# Patient Record
Sex: FEMALE | Race: WHITE
[De-identification: names, ages, dates, MRNs, and addresses within clinical notes are randomized per-mention and may not be internally consistent; named-entity substitution may affect disease eponyms.]

---

## 2022-02-21 ENCOUNTER — HOSPITAL ENCOUNTER (EMERGENCY)
Dept: HOSPITAL 75 - ER | Age: 32
Discharge: HOME | End: 2022-02-21
Payer: COMMERCIAL

## 2022-02-21 VITALS — DIASTOLIC BLOOD PRESSURE: 90 MMHG | SYSTOLIC BLOOD PRESSURE: 127 MMHG

## 2022-02-21 DIAGNOSIS — F32.9: ICD-10-CM

## 2022-02-21 DIAGNOSIS — Z79.899: ICD-10-CM

## 2022-02-21 DIAGNOSIS — K08.89: Primary | ICD-10-CM

## 2022-02-21 DIAGNOSIS — F41.9: ICD-10-CM

## 2022-02-21 PROCEDURE — 99283 EMERGENCY DEPT VISIT LOW MDM: CPT

## 2022-02-21 NOTE — ED EENT
History of Present Illness


General


Chief Complaint:  Dental Problems/Pain


Stated Complaint:  DENTAL PAIN


Nursing Triage Note:  


Pt arrives via POV from home for c/o lower right dental pain; onset Saturday AM.




Pt also reports that today is the last day of her quarantine after positive 


COVID test. 


 (ANUPAMA ROQUE DO)





Allergies and Home Medications


Allergies


Coded Allergies:  


     No Known Drug Allergies (Unverified , 10/17/10)





Patient Home Medication List


Amoxicillin/Potassium Clav (Augmentin 875-125 Tablet) 1 Each Tablet, 1 EACH PO 

BID


   Prescribed by: ANUPAMA ROQUE on 2/21/22 0159


Clindamycin HCl (Clindamycin HCl) 75 Mg Capsule, Unknown Dose PO, (Reported)


   Entered as Reported by: MARGIE PIKE on 10/12/19 2351


Dextroamphetamine/Amphetamine (Adderall 10 mg Tablet) 10 Mg Tablet, Unknown Dose

PO, (Reported)


   Entered as Reported by: MARGIE PIKE on 10/12/19 2351


Dextroamphetamine/Amphetamine (Adderall Xr 10 mg Capsule) 10 Mg Cap.er.24h, 

Unknown Dose PO, (Reported)


   Entered as Reported by: MARGIE PIKE on 10/12/19 2351


Fexofenadine HCl (Allegra Allergy) 60 Mg Tablet, Unknown Dose PO, (Reported)


   Entered as Reported by: MARGIE PIKE on 10/12/19 2351


Hydroxyzine HCl (Hydroxyzine HCl) 25 Mg Tablet, 25-50 MG PO Q6H


   Prescribed by: ANUPAMA ROQUE on 10/13/19 0003


Lidocaine HCl (Lidocaine HCl Viscous) 15 Ml Solution, 1-2 ML MM [id number]


   Prescribed by: ANUPAMA ROQUE on 2/21/22 0159


Naproxen (Naproxen) 500 Mg Tablet.dr, 500 MG PO BID


   Prescribed by: ANUPAMA ROQUE on 2/21/22 0159


Prednisone (Prednisone) 20 Mg Tab, 40 MG PO DAILY


   Prescribed by: ANUPAMA ROQUE on 10/13/19 0003


Ranitidine HCl (Acid Reducer (RANITIDINE)) 75 Mg Tablet, Unknown Dose PO BID, 

(Reported)


   Entered as Reported by: MARGIE PIKE on 10/12/19 2351


Sertraline HCl (Zoloft) 25 Mg Tablet, Unknown Dose PO, (Reported)


   Entered as Reported by: MARGIE PIKE on 10/12/19 2351





Past Medical-Social-Family Hx


Patient Social History


Tobacco Use?:  No


Use of E-Cig and/or Vaping dev:  No


Substance use?:  No


Alcohol Use?:  No


Pt feels they are or have been:  No


 (ANUPAMA ROQUE DO)





Immunizations Up To Date


Tetanus Booster (TDap):  Unknown


PED Vaccines UTD:  Yes


Influenza Vaccine Up-to-Date:  Yes; Up-to-Date


COVID19 Vaccine :  Pfizer


 (ANUPAMA ROQUE DO)





Seasonal Allergies


Seasonal Allergies:  Yes


 (ANUPAMA ROQUE DO)





Past Medical History


Surgeries:  No


Respiratory:  No


Cardiac:  No


Neurological:  No


Reproductive Disorders:  Yes


Female Reproductive Disorders:  Menstrual Problems


Genitourinary:  No


Gastrointestinal:  No


Musculoskeletal:  No


Endocrine:  No


HEENT:  Yes (DENTAL INFECTION)


Cancer:  No


Psychosocial:  Yes


ADD/ADHD, Anxiety, Depression


Integumentary:  No


Blood Disorders:  No


 (ANUPAMA ROQUE DO)





Family Medical History


No Pertinent Family Hx


 (ANUPAMA ROQUE DO)





Physical Exam


Vital Signs





Vital Signs - First Documented








 2/21/22





 01:00


 


Temp 35.8


 


Pulse 83


 


Resp 18


 


B/P (MAP) 127/90 (102)


 


Pulse Ox 98


 


O2 Delivery Room Air





 (MARBIN CHAMPION MD)


Height, Weight, BMI


Height: 5'2.00"


Weight: 180lbs. oz. 81.223057uo; 44.00 BMI


Method:Stated


 


 (ANUPAMA ROQUE DO)





Progress/Results/Core Measures


Results/Orders


Medications Given in ED





Current Medications








 Medications  Dose


 Ordered  Sig/Maria Teresa


 Route  Start Time


 Stop Time Status Last Admin


Dose Admin


 


 Lidocaine HCl  5 ml  ONCE  ONCE


 MM  2/21/22 02:00


 2/21/22 02:01 DC 2/21/22 02:11


5 ML





 (MARBIN CHAMPION MD)


Vital Signs/I&O











 2/21/22





 01:00


 


Temp 35.8


 


Pulse 83


 


Resp 18


 


B/P (MAP) 127/90 (102)


 


Pulse Ox 98


 


O2 Delivery Room Air





 (MARBIN CHAMPION MD)








Blood Pressure Mean:                    102











Departure


Impression





   Primary Impression:  


   Pain, dental


Disposition:  01 HOME, SELF-CARE


Condition:  Stable





Departure-Patient Inst.


Decision time for Depature:  01:55


 (ANUPAMA ROQUE DO)


Referrals:  


NO,LOCAL PHYSICIAN (PCP/Family)


Primary Care Physician


Patient Instructions:  Dental Pain





Add. Discharge Instructions:  


follow up with your dentist this week for further care





All discharge instructions reviewed with patient and/or family. Voiced 

understanding.


Scripts


Lidocaine HCl (Lidocaine HCl Viscous) 15 Ml Solution


1-2 ML MM [id number], #120 ML


   Prov: ANUPAMA ROQUE DO         2/21/22 


Naproxen (Naproxen) 500 Mg Tablet.dr


500 MG PO BID, #20 TAB


   Prov: ANUPAMA ROQUE DO         2/21/22 


Amoxicillin/Potassium Clav (Augmentin 875-125 Tablet) 1 Each Tablet


1 EACH PO BID for 10 Days, #20 TAB


   Prov: ANUPAMA ROQUE DO         2/21/22











ANUPAMA ROQUE DO                 Feb 21, 2022 01:59


MARBIN CHAMPION MD         Feb 21, 2022 09:31

## 2022-07-06 ENCOUNTER — HOSPITAL ENCOUNTER (OUTPATIENT)
Dept: HOSPITAL 75 - RAD | Age: 32
End: 2022-07-06
Attending: NURSE PRACTITIONER
Payer: COMMERCIAL

## 2022-07-06 DIAGNOSIS — N83.202: Primary | ICD-10-CM

## 2022-07-06 PROCEDURE — 76856 US EXAM PELVIC COMPLETE: CPT

## 2022-07-06 PROCEDURE — 76830 TRANSVAGINAL US NON-OB: CPT

## 2022-07-06 NOTE — DIAGNOSTIC IMAGING REPORT
PROCEDURE: Pelvic comp/transvaginal sonogram.



TECHNIQUE: Complete transabdominal and transvaginal pelvic

ultrasound was performed. In addition, limited pelvic Doppler was

performed.



INDICATION:  Irregular menses.



Uterus is anteverted measuring 5.7 x 3.4 x 4.2 cm. Endometrium is

10 mm in thickness. No myometrial mass is detected. Right ovary

measures 3.4 x 2.1 x 2.3 cm and the left ovary measures 2.6 x 2.0

x 2.9 cm. Right ovary contains several small follicles. Left

ovary contains a 2.3 x 1.3 x 2.3 cm cyst. Both ovaries

demonstrate blood flow. There is no free fluid detected.



IMPRESSION: 2.3 cm left ovarian cyst. The study is otherwise

unremarkable.







Dictated by: 



  Dictated on workstation # [id number]

## 2023-07-17 ENCOUNTER — HOSPITAL ENCOUNTER (EMERGENCY)
Dept: HOSPITAL 75 - ER | Age: 33
Discharge: HOME | End: 2023-07-17
Payer: COMMERCIAL

## 2023-07-17 VITALS — HEIGHT: 61.81 IN | WEIGHT: 249.78 LBS | BODY MASS INDEX: 45.97 KG/M2

## 2023-07-17 VITALS — DIASTOLIC BLOOD PRESSURE: 63 MMHG | SYSTOLIC BLOOD PRESSURE: 120 MMHG

## 2023-07-17 DIAGNOSIS — K80.51: Primary | ICD-10-CM

## 2023-07-17 LAB
ALBUMIN SERPL-MCNC: 4.2 GM/DL (ref 3.2–4.5)
ALP SERPL-CCNC: 60 U/L (ref 40–136)
ALT SERPL-CCNC: 46 U/L (ref 0–55)
APTT PPP: YELLOW S
BACTERIA #/AREA URNS HPF: (no result) /HPF
BASOPHILS # BLD AUTO: 0.1 10^3/UL (ref 0–0.1)
BASOPHILS NFR BLD AUTO: 0 % (ref 0–10)
BILIRUB SERPL-MCNC: 0.4 MG/DL (ref 0.1–1)
BILIRUB UR QL STRIP: NEGATIVE
BUN/CREAT SERPL: 18
CALCIUM SERPL-MCNC: 9.6 MG/DL (ref 8.5–10.1)
CHLORIDE SERPL-SCNC: 107 MMOL/L (ref 98–107)
CO2 SERPL-SCNC: 24 MMOL/L (ref 21–32)
CREAT SERPL-MCNC: 0.79 MG/DL (ref 0.6–1.3)
EOSINOPHIL # BLD AUTO: 0.1 10^3/UL (ref 0–0.3)
EOSINOPHIL NFR BLD AUTO: 1 % (ref 0–10)
FIBRINOGEN PPP-MCNC: CLEAR MG/DL
GFR SERPLBLD BASED ON 1.73 SQ M-ARVRAT: 103 ML/MIN
GLUCOSE SERPL-MCNC: 94 MG/DL (ref 70–105)
GLUCOSE UR STRIP-MCNC: NEGATIVE MG/DL
HCT VFR BLD CALC: 39 % (ref 35–52)
HGB BLD-MCNC: 12.5 G/DL (ref 11.5–16)
KETONES UR QL STRIP: NEGATIVE
LEUKOCYTE ESTERASE UR QL STRIP: (no result)
LIPASE SERPL-CCNC: 67 U/L (ref 8–78)
LYMPHOCYTES # BLD AUTO: 2.7 10^3/UL (ref 1–4)
LYMPHOCYTES NFR BLD AUTO: 22 % (ref 12–44)
MANUAL DIFFERENTIAL PERFORMED BLD QL: NO
MCH RBC QN AUTO: 27 PG (ref 25–34)
MCHC RBC AUTO-ENTMCNC: 32 G/DL (ref 32–36)
MCV RBC AUTO: 84 FL (ref 80–99)
MONOCYTES # BLD AUTO: 0.8 10^3/UL (ref 0–1)
MONOCYTES NFR BLD AUTO: 6 % (ref 0–12)
NEUTROPHILS # BLD AUTO: 8.7 10^3/UL (ref 1.8–7.8)
NEUTROPHILS NFR BLD AUTO: 70 % (ref 42–75)
NITRITE UR QL STRIP: NEGATIVE
PH UR STRIP: 7.5 [PH] (ref 5–9)
PLATELET # BLD: 357 10^3/UL (ref 130–400)
PMV BLD AUTO: 10.3 FL (ref 9–12.2)
POTASSIUM SERPL-SCNC: 4.2 MMOL/L (ref 3.6–5)
PROT SERPL-MCNC: 7.6 GM/DL (ref 6.4–8.2)
PROT UR QL STRIP: NEGATIVE
RBC #/AREA URNS HPF: (no result) /HPF
SODIUM SERPL-SCNC: 139 MMOL/L (ref 135–145)
SP GR UR STRIP: 1.01 (ref 1.02–1.02)
SQUAMOUS #/AREA URNS HPF: (no result) /HPF
WBC # BLD AUTO: 12.4 10^3/UL (ref 4.3–11)
WBC #/AREA URNS HPF: (no result) /HPF

## 2023-07-17 PROCEDURE — 36415 COLL VENOUS BLD VENIPUNCTURE: CPT

## 2023-07-17 PROCEDURE — 80053 COMPREHEN METABOLIC PANEL: CPT

## 2023-07-17 PROCEDURE — 74177 CT ABD & PELVIS W/CONTRAST: CPT

## 2023-07-17 PROCEDURE — 85025 COMPLETE CBC W/AUTO DIFF WBC: CPT

## 2023-07-17 PROCEDURE — 81000 URINALYSIS NONAUTO W/SCOPE: CPT

## 2023-07-17 PROCEDURE — 83690 ASSAY OF LIPASE: CPT

## 2023-07-17 PROCEDURE — 84703 CHORIONIC GONADOTROPIN ASSAY: CPT

## 2023-07-17 NOTE — DIAGNOSTIC IMAGING REPORT
PROCEDURE: CT abdomen and pelvis with contrast.



TECHNIQUE: Multiple contiguous axial images were obtained through

the abdomen and pelvis after administration of intravenous

contrast. Auto Exposure Controls were utilized during the CT exam

to meet ALARA standards for radiation dose reduction. All CT

scans use one or more of the following dose optimizing

techniques: automated exposure control, MA and/or KvP adjustment

based on patient size and exam type or iterative reconstruction.



INDICATION:  32-year-old female, abdominal pain with radiation to

the back starting yesterday. Worse after eating.



CORRELATION STUDY: None.



FINDINGS:  

LOWER THORAX: Clear.



LIVER: Mild steatosis without focal lesion.

GALLBLADDER: Small gallstone. Question of some mild gallbladder

wall thickening. No bile duct dilatation.

SPLEEN: Unremarkable.

PANCREAS: Unremarkable.

ADRENAL GLANDS: Unremarkable.

KIDNEYS: Normal configuration.  No calcification or obstruction.

ABDOMINAL AORTA: Unremarkable, nonaneurysmal.  A few scattered

mildly prominent mesenteric lymph nodes particularly in the right

lower quadrant.



GASTROINTESTINAL TRACT: No gastrointestinal tract obstruction or

inflammation. Appendix not well visualized but there is no

secondary findings to suggest acute appendicitis. No abdominal

ascites or free air.



URINARY BLADDER: Unremarkable.

REPRODUCTIVE: Uterus and adnexa unremarkable.



OSSEOUS STRUCTURES: No acute abnormality.



OTHER:  None.



IMPRESSION:

1. Cholelithiasis. Question mild gallbladder wall thickening. No

overt bile duct dilatation. If indicated, right upper quadrant

abdominal ultrasound imaging recommended.

2. Mild steatosis of the liver.

3. A few mildly prominent right lower quadrant mesenteric lymph

nodes.



Dictated by: 



  Dictated on workstation # [id number]

## 2023-07-17 NOTE — ED ABDOMINAL PAIN
General


Stated Complaint:  ABD PAIN


Source of Information:  Patient


Exam Limitations:  No Limitations





History of Present Illness


Date Seen by Provider:  Jul 17, 2023


Time Seen by Provider:  18:01


Initial Comments


Patient is a 32-year-old female who presents to ED with epigastric right upper 

quadrant abdominal pain.  Pain is described as sharp started between 2 or 3 PM 

today.  She ate lunch 30 minutes prior.  Pain initially was around 10 out of 10.

 This pain has decreased about 5 out of 10 at this time.  She had similar type 

pain yesterday that lasted for a few hours.  She did vomit once.  She reports 

nausea at this time.  No diarrhea.  She was seen at [facility] was recommended come to 

ED for further evaluation.  Denies any alcohol use or excessive NSAID use.  Last

menstrual cycle a week ago.  No vaginal discharge, vaginal bleeding, dysuria, 

hematuria.  No history of previous abdominal surgery.  Denies fever, chills, 

body aches, chest pain, shortness of breath





Allergies and Home Medications


Allergies


Coded Allergies:  


     No Known Drug Allergies (Unverified , 10/17/10)





Patient Home Medication List


Home Medication List Reviewed:  Yes


Amoxicillin/Potassium Clav (Augmentin 875-125 Tablet) 1 Each Tablet, 1 EACH PO 

BID


   Prescribed by: ANUPAMA ROQUE on 2/21/22 0159


Clindamycin HCl (Clindamycin HCl) 75 Mg Capsule, Unknown Dose PO, (Reported)


   Entered as Reported by: MARGIE PIKE on 10/12/19 2351


Dextroamphetamine/Amphetamine (Adderall 10 mg Tablet) 10 Mg Tablet, Unknown Dose

PO, (Reported)


   Entered as Reported by: MARGIE PIKE on 10/12/19 2351


Dextroamphetamine/Amphetamine (Adderall Xr 10 mg Capsule) 10 Mg Cap.er.24h, 

Unknown Dose PO, (Reported)


   Entered as Reported by: MARGIE PIKE on 10/12/19 2351


Fexofenadine HCl (Allegra Allergy) 60 Mg Tablet, Unknown Dose PO, (Reported)


   Entered as Reported by: MARGIE PIKE on 10/12/19 2351


Hydroxyzine HCl (Hydroxyzine HCl) 25 Mg Tablet, 25-50 MG PO Q6H


   Prescribed by: ANUPAMA ROQUE on 10/13/19 0003


Lidocaine HCl (Lidocaine HCl Viscous) 15 Ml Solution, 1-2 ML MM [id number]


   Prescribed by: ANUPAMA ROQUE on 2/21/22 0159


Naproxen (Naproxen) 500 Mg Tablet.dr, 500 MG PO BID


   Prescribed by: ANUPAMA ROQUE on 2/21/22 0159


Prednisone (Prednisone) 20 Mg Tab, 40 MG PO DAILY


   Prescribed by: ANUPAMA ROQUE on 10/13/19 0003


Ranitidine HCl (Acid Reducer (RANITIDINE)) 75 Mg Tablet, Unknown Dose PO BID, 

(Reported)


   Entered as Reported by: MARGIE PIKE on 10/12/19 2351


Sertraline HCl (Zoloft) 25 Mg Tablet, Unknown Dose PO, (Reported)


   Entered as Reported by: MARGIE PIKE on 10/12/19 2351





Review of Systems


Review of Systems


Constitutional:  No chills, No diaphoresis, No malaise, No weakness


EENTM:  No Double Vision, No Eye Pain


Respiratory:  Denies Cough, Denies Orthopnea


Cardiovascular:  Denies Chest Pain


Gastrointestinal:  Abdominal Pain; Denies Diarrhea; Nausea, Vomiting


Genitourinary:  Denies Burning, Denies Discharge


Musculoskeletal:  No back pain, No joint pain


Skin:  No change in color, No change in hair/nails





All Other Systems Reviewed


Negative Unless Noted:  Yes





Past Medical-Social-Family Hx


Immunizations Up To Date


Tetanus Booster (TDap):  Unknown


PED Vaccines UTD:  Yes





Seasonal Allergies


Seasonal Allergies:  Yes





Past Medical History


Surgeries:  No


Respiratory:  No


Cardiac:  No


Neurological:  No


Reproductive Disorders:  Yes


Female Reproductive Disorders:  Menstrual Problems


Genitourinary:  No


Gastrointestinal:  No


Musculoskeletal:  No


Endocrine:  No


HEENT:  Yes (DENTAL INFECTION)


Cancer:  No


Psychosocial:  Yes


ADD/ADHD, Anxiety, Depression


Integumentary:  No


Blood Disorders:  No





Family Medical History


No Pertinent Family Hx





Physical Exam


Vital Signs





Vital Signs - First Documented








 7/17/23





 17:54


 


Temp 36.6


 


Pulse 79


 


Resp 16


 


B/P (MAP) 120/63 (82)


 


Pulse Ox 99





Capillary Refill :


Height/Weight/BMI


Height: 5'2.00"


Weight: 180lbs. oz. 81.659381nk; 44.00 BMI


Method:Stated


General Appearance:  WD/WN, no apparent distress


HEENT:  PERRL/EOMI, normal ENT inspection, TMs normal, pharynx normal


Neck:  non-tender, full range of motion, supple


Respiratory:  chest non-tender, lungs clear, normal breath sounds, no 

respiratory distress, no accessory muscle use


Cardiovascular:  regular rate, rhythm, no edema, no gallop, no JVD


Gastrointestinal:  normal bowel sounds, soft, no organomegaly, no pulsatile 

mass, tenderness (Epigastric tenderness, right upper quadrant tenderness.  

Normal bowel sounds throughout.)


Extremities:  normal range of motion, non-tender, normal inspection, no pedal 

edema


Back:  normal inspection, no CVA tenderness, no vertebral tenderness


Neurologic/Psychiatric:  CNs II-XII nml as tested, no motor/sensory deficits, 

alert, normal mood/affect, oriented x 3


Skin:  normal color, warm/dry





Progress/Results/Core Measures


Results/Orders


Lab Results





Laboratory Tests








Test


 7/17/23


18:21 7/17/23


19:35 Range/Units


 


 


White Blood Count


 12.4 H


 


 4.3-11.0


10^3/uL


 


Red Blood Count


 4.70 


 


 3.80-5.11


10^6/uL


 


Hemoglobin 12.5   11.5-16.0  g/dL


 


Hematocrit 39   35-52  %


 


Mean Corpuscular Volume 84   80-99  fL


 


Mean Corpuscular Hemoglobin 27   25-34  pg


 


Mean Corpuscular Hemoglobin


Concent 32 


 


 32-36  g/dL





 


Red Cell Distribution Width 13.4   10.0-14.5  %


 


Platelet Count


 357 


 


 130-400


10^3/uL


 


Mean Platelet Volume 10.3   9.0-12.2  fL


 


Immature Granulocyte % (Auto) 1    %


 


Neutrophils (%) (Auto) 70   42-75  %


 


Lymphocytes (%) (Auto) 22   12-44  %


 


Monocytes (%) (Auto) 6   0-12  %


 


Eosinophils (%) (Auto) 1   0-10  %


 


Basophils (%) (Auto) 0   0-10  %


 


Neutrophils # (Auto)


 8.7 H


 


 1.8-7.8


10^3/uL


 


Lymphocytes # (Auto)


 2.7 


 


 1.0-4.0


10^3/uL


 


Monocytes # (Auto)


 0.8 


 


 0.0-1.0


10^3/uL


 


Eosinophils # (Auto)


 0.1 


 


 0.0-0.3


10^3/uL


 


Basophils # (Auto)


 0.1 


 


 0.0-0.1


10^3/uL


 


Immature Granulocyte # (Auto)


 0.1 


 


 0.0-0.1


10^3/uL


 


Sodium Level 139   135-145  MMOL/L


 


Potassium Level 4.2   3.6-5.0  MMOL/L


 


Chloride Level 107     MMOL/L


 


Carbon Dioxide Level 24   21-32  MMOL/L


 


Anion Gap 8   5-14  MMOL/L


 


Blood Urea Nitrogen 14   7-18  MG/DL


 


Creatinine


 0.79 


 


 0.60-1.30


MG/DL


 


Estimat Glomerular Filtration


Rate 103 


 


  





 


BUN/Creatinine Ratio 18    


 


Glucose Level 94     MG/DL


 


Calcium Level 9.6   8.5-10.1  MG/DL


 


Corrected Calcium 9.4   8.5-10.1  MG/DL


 


Total Bilirubin 0.4   0.1-1.0  MG/DL


 


Aspartate Amino Transf


(AST/SGOT) 47 H


 


 5-34  U/L





 


Alanine Aminotransferase


(ALT/SGPT) 46 


 


 0-55  U/L





 


Alkaline Phosphatase 60     U/L


 


Total Protein 7.6   6.4-8.2  GM/DL


 


Albumin 4.2   3.2-4.5  GM/DL


 


Lipase 67   8-78  U/L


 


Serum Pregnancy Test,


Qualitative NEGATIVE 


 


 NEGATIVE  





 


Urine Color  YELLOW   


 


Urine Clarity  CLEAR   


 


Urine pH  7.5  5-9  


 


Urine Specific Gravity  1.010 L 1.016-1.022  


 


Urine Protein  NEGATIVE  NEGATIVE  


 


Urine Glucose (UA)  NEGATIVE  NEGATIVE  


 


Urine Ketones  NEGATIVE  NEGATIVE  


 


Urine Nitrite  NEGATIVE  NEGATIVE  


 


Urine Bilirubin  NEGATIVE  NEGATIVE  


 


Urine Urobilinogen  0.2  < = 1.0  MG/DL


 


Urine Leukocyte Esterase  1+ H NEGATIVE  


 


Urine RBC (Auto)  NEGATIVE  NEGATIVE  


 


Urine RBC  NONE   /HPF


 


Urine WBC  2-5   /HPF


 


Urine Squamous Epithelial


Cells 


 0-2 


  /HPF





 


Urine Crystals  NONE   /LPF


 


Urine Bacteria  TRACE   /HPF


 


Urine Casts  NONE   /LPF


 


Urine Mucus  NEGATIVE   /LPF


 


Urine Culture Indicated  NO   








My Orders





Orders - BENTLEY SUH PA


Ua Culture If Indicated (7/17/23 17:45)


Cbc With Automated Diff (7/17/23 18:00)


Comprehensive Metabolic Panel (7/17/23 18:00)


Lipase (7/17/23 18:00)


Ns Iv 1000 Ml (Sodium Chloride 0.9%) (7/17/23 18:00)


Ondansetron Injection (Zofran Injectio (7/17/23 18:00)


Morphine  Injection (Morphine  Injection (7/17/23 18:00)


Ct Abdomen/Pelvis W (7/17/23 18:20)


Iohexol Injection (Omnipaque 350 Mg/Ml 1 (7/17/23 19:00)


Ns (Ivpb) (Sodium Chloride 0.9% Ivpb Bag (7/17/23 19:00)


Hcg,Qualitative Serum (7/17/23 19:06)





Medications Given in ED





Current Medications








 Medications  Dose


 Ordered  Sig/Maria Teresa


 Route  Start Time


 Stop Time Status Last Admin


Dose Admin


 


 Iohexol  100 ml  ONCE  ONCE


 IV  7/17/23 19:00


 7/17/23 19:01 DC 7/17/23 19:52


100 ML


 


 Morphine Sulfate  4 mg  ONCE  ONCE


 IVP  7/17/23 18:00


 7/17/23 18:02 DC 7/17/23 18:30


4 MG


 


 Ondansetron HCl  4 mg  ONCE  ONCE


 IVP  7/17/23 18:00


 7/17/23 18:02 DC 7/17/23 18:30


4 MG


 


 Sodium Chloride  100 ml  ONCE  ONCE


 IV  7/17/23 19:00


 7/17/23 19:01 DC 7/17/23 19:52


80 ML








Vital Signs/I&O











 7/17/23





 17:54


 


Temp 36.6


 


Pulse 79


 


Resp 16


 


B/P (MAP) 120/63 (82)


 


Pulse Ox 99











Departure


Communication (PCP)


Reviewed previous ER visits, H&P, lab testing.  Differential diagnosis acute 

cholecystitis, cholelithiasis, gastritis, pancreatitis.  Patient is a 

32-year-old female who presents the ED with epigastric right upper quadrant 

pain.  Episode yesterday lasted a few hours as well as today after she ate 

something fatty.  30 minutes after eating started having this pain sharp radi

ating to the back.  Vomited once.  On arrival she has epigastric right upper 

quadrant tenderness.  CBC, CMP, lipase was ordered.  White blood count 12.4.  

Normal bilirubin.  AST 47, ALT 46 normal lipase.  Negative for pregnancy.  

Urinalysis negative for infection.  Do not have ultrasound during the evening.  

IWas able to get a CT abdomen pelvis.  CT abdomen pelvis shows cholelithiasis.  

Question of mild gallbladder wall thickening.  No overt bile duct dilation.  

Mild stenosis of the liver.  Patient was given morphine and Zofran.  Pain 

completely resolved.  Patient was discussed with Dr. Tapia general surgeon.  

Since patient is pain-free and patient felt comfortable to go home he recommend 

following up in the office tomorrow.  Discussed further treatment.  I did 

discuss with patient may be reasonable to stay n.p.o. over midnight.  Recommend 

avoiding any fatty foods.  Discussed with patient that this will likely worsen 

and she will likely require surgical intervention.  She acknowledges.  If any 

worsening pain, fever, chills, vomiting to return back to ED.





Impression





   Primary Impression:  


   Cholelithiasis


Disposition:  01 HOME, SELF-CARE


Condition:  Stable





Departure-Patient Inst.


Decision time for Depature:  20:23


Referrals:  


KULWINDER EM MD (PCP/Family)


Primary Care Physician








NORBERT TAPIA DO


Patient Instructions:  Gallstones





Add. Discharge Instructions:  


No fatty foods.  Recommend follow-up with Dr. Tapia tomorrow.  May consider 

staying n.p.o. after midnight.











BENTLEY SUH          Jul 17, 2023 18:04

## 2023-07-18 ENCOUNTER — HOSPITAL ENCOUNTER (EMERGENCY)
Dept: HOSPITAL 75 - ER | Age: 33
Discharge: HOME | End: 2023-07-18
Payer: COMMERCIAL

## 2023-07-18 VITALS — HEIGHT: 61.81 IN | BODY MASS INDEX: 45.97 KG/M2 | WEIGHT: 249.78 LBS

## 2023-07-18 VITALS — SYSTOLIC BLOOD PRESSURE: 107 MMHG | DIASTOLIC BLOOD PRESSURE: 65 MMHG

## 2023-07-18 DIAGNOSIS — K80.50: Primary | ICD-10-CM

## 2023-07-18 DIAGNOSIS — E66.9: ICD-10-CM

## 2023-07-18 PROCEDURE — 99284 EMERGENCY DEPT VISIT MOD MDM: CPT

## 2023-07-18 NOTE — ED ABDOMINAL PAIN
General


Chief Complaint:  Abdominal/GI Problems


Stated Complaint:  GALLSTONE PAIN X 2 DAYS


Source of Information:  Patient


Exam Limitations:  No Limitations





History of Present Illness


Date Seen by Provider:  Jul 18, 2023


Time Seen by Provider:  01:38


Initial Comments


Patient is a 32-year-old female who presents to the emergency department with a 

chief complaint of epigastric pain radiating to her back onset about midnight.  

Patient was seen in the emergency department earlier in the evening and 

diagnosed with cholelithiasis with questionable gallbladder wall thickening.  No

fevers or chills.  She has not vomited this evening.  Patient was advised to 

follow-up with Dr. Tapia later today.  Aamir Zaman PA-C discussed the case 

with Dr. Tapia prior to discharge.  Patient states that she has not run any 

fever.  She has not had any diarrhea, black or bloody stools.  After discharge 

she ate some rice without butter.  She states the pain feels like she is being 

both "stabbed and sat on" at the same time


Timing/Duration:  1-3 Hours


Severity/Quality:  Severe, Aching, Sharp, Stabbing


Location:  Epigastric


Radiation:  Back


Activities at Onset:  Other (after eating)


Associated Symptoms:  Nausea/Vomiting





Allergies and Home Medications


Allergies


Coded Allergies:  


     No Known Drug Allergies (Unverified , 10/17/10)





Patient Home Medication List


Home Medication List Reviewed:  Yes


Amoxicillin/Potassium Clav (Augmentin 875-125 Tablet) 1 Each Tablet, 1 EACH PO 

BID


   Prescribed by: ANUPAMA ROQUE on 2/21/22 0159


Clindamycin HCl (Clindamycin HCl) 75 Mg Capsule, Unknown Dose PO, (Reported)


   Entered as Reported by: MARGIE PIKE on 10/12/19 2351


Dextroamphetamine/Amphetamine (Adderall 10 mg Tablet) 10 Mg Tablet, Unknown Dose

PO, (Reported)


   Entered as Reported by: MARGIE PIKE on 10/12/19 2351


Dextroamphetamine/Amphetamine (Adderall Xr 10 mg Capsule) 10 Mg Cap.er.24h, 

Unknown Dose PO, (Reported)


   Entered as Reported by: MARGIE PIKE on 10/12/19 2351


Dicyclomine HCl (Dicyclomine HCl) 20 Mg Tablet, 20 MG PO QIDACHS PRN for 

abdominal cramping


   Prescribed by: MARBIN CHAMPION on 7/18/23 0329


Fexofenadine HCl (Allegra Allergy) 60 Mg Tablet, Unknown Dose PO, (Reported)


   Entered as Reported by: MARGIE PIKE on 10/12/19 2351


Hydroxyzine HCl (Hydroxyzine HCl) 25 Mg Tablet, 25-50 MG PO Q6H


   Prescribed by: ANUPAMA ROQUE on 10/13/19 0003


Lidocaine HCl (Lidocaine HCl Viscous) 15 Ml Solution, 1-2 ML MM [id number]


   Prescribed by: ANUPAMA ROQUE on 2/21/22 0159


Naproxen (Naproxen) 500 Mg Tablet.dr, 500 MG PO BID


   Prescribed by: ANUPAMA ROQUE on 2/21/22 0159


Ondansetron (Ondansetron Odt) 4 Mg Tab.rapdis, 4 MG SL Q8H PRN for 

NAUSEA/VOMITING


   Prescribed by: MARBIN CHAMPION on 7/18/23 0329


Prednisone (Prednisone) 20 Mg Tab, 40 MG PO DAILY


   Prescribed by: ANUPAMA ROQUE on 10/13/19 0003


Ranitidine HCl (Acid Reducer (RANITIDINE)) 75 Mg Tablet, Unknown Dose PO BID, 

(Reported)


   Entered as Reported by: MARGIE PIKE on 10/12/19 2351


Sertraline HCl (Zoloft) 25 Mg Tablet, Unknown Dose PO, (Reported)


   Entered as Reported by: MARGIE PIKE on 10/12/19 2351





Review of Systems


Review of Systems


Constitutional:  see HPI


Respiratory:  No Symptoms Reported


Cardiovascular:  No Symptoms Reported


Gastrointestinal:  Abdominal Pain, Nausea


Genitourinary:  No Symptoms Reported


Musculoskeletal:  back pain





Past Medical-Social-Family Hx


Immunizations Up To Date


Tetanus Booster (TDap):  Unknown


PED Vaccines UTD:  Yes





Seasonal Allergies


Seasonal Allergies:  Yes





Past Medical History


Surgery/Hospitalization HX:  


pcos, adhd, depression


Surgeries:  No


Respiratory:  No


Cardiac:  No


Neurological:  No


Reproductive Disorders:  Yes


Female Reproductive Disorders:  Menstrual Problems


Genitourinary:  No


Gastrointestinal:  No


Musculoskeletal:  No


Endocrine:  No


HEENT:  Yes (DENTAL INFECTION)


Cancer:  No


Psychosocial:  Yes


ADD/ADHD, Anxiety, Depression


Integumentary:  No


Blood Disorders:  No





Family Medical History


No Pertinent Family Hx





Physical Exam


Vital Signs





Vital Signs - First Documented








 7/18/23





 01:29


 


Temp 36.4


 


Pulse 73


 


Resp 16


 


B/P (MAP) 135/77 (96)


 


Pulse Ox 97


 


O2 Delivery Room Air





Capillary Refill :


Height/Weight/BMI


Height: 5'2.00"


Weight: 180lbs. oz. [age]yo; 45.00 BMI


Method:Stated


General Appearance:  WD/WN, moderate distress (tearful anxious), obese


HEENT:  PERRL/EOMI


Respiratory:  lungs clear, normal breath sounds, no respiratory distress, no 

accessory muscle use


Cardiovascular:  regular rate, rhythm


Gastrointestinal:  soft, abnormal bowel sounds (hypoactive), tenderness 

(epigastric tenderness)


Extremities:  normal inspection


Neurologic/Psychiatric:  alert, oriented x 3


Skin:  normal color, warm/dry





Progress/Results/Core Measures


Results/Orders


My Orders





Orders - MARBIN CHAMPION MD


Dicyclomine Injection (Bentyl Injection) (7/18/23 01:46)


Ondansetron  Oral Dissolve Tab (Zofran (7/18/23 02:00)


Hydrocodone/Apap 5/325 Tablet (Lortab 5 (7/18/23 02:00)


Morphine  Injection (Morphine  Injection (7/18/23 03:01)


Rx-Hydrocodone/Apap 5-325 Mg (Rx-Vicodin (7/18/23 03:30)





Medications Given in ED





Current Medications








 Medications  Dose


 Ordered  Sig/Maria Teresa


 Route  Start Time


 Stop Time Status Last Admin


Dose Admin


 


 Acetaminophen/


 Hydrocodone Bitart  1 ea  ONCE  ONCE


 PO  7/18/23 02:00


 7/18/23 02:01 DC 7/18/23 02:11


1 EA


 


 Ondansetron HCl  4 mg  ONCE  ONCE


 PO  7/18/23 02:00


 7/18/23 02:01 DC 7/18/23 02:11


4 MG








Vital Signs/I&O











 7/18/23





 01:29


 


Temp 36.4


 


Pulse 73


 


Resp 16


 


B/P (MAP) 135/77 (96)


 


Pulse Ox 97


 


O2 Delivery Room Air











Progress


Progress Note :  


   Time:  03:00


Progress Note


Patient states absolutely no relief from pain medications.  Sitting at the 

bedside in the chair crying.  I again reviewed records from earlier in the night

to look for "red flags"/missed issues.  I have not repeated labs as the patient 

has not been vomiting, is not febrile,no diarrhea and stable VS.  Will provide 

another dose of pain medications.





Departure


Impression





   Primary Impression:  


   Biliary colic


Disposition:  01 HOME, SELF-CARE


Condition:  Improved





Departure-Patient Inst.


Decision time for Depature:  03:30


Referrals:  


KULWINDER EM MD (PCP/Family)


Primary Care Physician


Patient Instructions:  Gallstones





Add. Discharge Instructions:  


Take the pain medications - Hydrocodone, 1 every 6 hours as needed for severe 

pain. This medication can make you sleepy and cause constipation, so you should 

take a stool softener while taking it.





Drink lots of water to stay well hydrated. Avoid fatty foods.





Dicyclomine 20mg tablets, 1 30 min before each meal, every 6 hours and at 

bedtime to help with gallbladder pain.





Ondansetron 4mg orally disintegrating tablets, 1 tablet every 8 hours as needed 

for nausea.





If you develop a fever, worsening pain. vomiting/inability to hold down me

dications - return to the Emergency Department for re-evaluation.





Please keep your appointment with Dr Tapia later today - call the office for an

appointment time..


Scripts


Ondansetron (Ondansetron Odt) 4 Mg Tab.rapdis


4 MG SL Q8H PRN for NAUSEA/VOMITING, #15 TAB


   Prov: MARBIN CHAMPION MD         7/18/23 


Dicyclomine HCl (Dicyclomine HCl) 20 Mg Tablet


20 MG PO QIDACHS PRN for abdominal cramping, #60 TAB


   Prov: MARBIN CHAMPION MD         7/18/23





Copy


Copies To 1:   KULWINDER EM MD


Copies To 2:   NORBERT TAPIA KATHRYN M MD         Jul 18, 2023 01:38

## 2023-07-26 ENCOUNTER — HOSPITAL ENCOUNTER (OUTPATIENT)
Dept: HOSPITAL 75 - PREOP | Age: 33
End: 2023-07-26
Attending: SURGERY
Payer: COMMERCIAL

## 2023-07-26 VITALS — BODY MASS INDEX: 45.43 KG/M2 | WEIGHT: 250 LBS | HEIGHT: 62.01 IN

## 2023-07-26 DIAGNOSIS — Z01.818: Primary | ICD-10-CM

## 2023-08-30 ENCOUNTER — HOSPITAL ENCOUNTER (OUTPATIENT)
Dept: HOSPITAL 75 - PREOP | Age: 33
LOS: 1 days | Discharge: HOME | End: 2023-08-31
Attending: SURGERY
Payer: COMMERCIAL

## 2023-08-30 VITALS — BODY MASS INDEX: 43.66 KG/M2 | HEIGHT: 62.01 IN | WEIGHT: 240.3 LBS

## 2023-08-30 DIAGNOSIS — Z01.818: Primary | ICD-10-CM

## 2023-09-06 ENCOUNTER — HOSPITAL ENCOUNTER (OUTPATIENT)
Dept: HOSPITAL 75 - SDC | Age: 33
Discharge: HOME | End: 2023-09-06
Attending: SURGERY
Payer: COMMERCIAL

## 2023-09-06 VITALS — DIASTOLIC BLOOD PRESSURE: 76 MMHG | SYSTOLIC BLOOD PRESSURE: 120 MMHG

## 2023-09-06 VITALS — DIASTOLIC BLOOD PRESSURE: 69 MMHG | SYSTOLIC BLOOD PRESSURE: 116 MMHG

## 2023-09-06 VITALS — SYSTOLIC BLOOD PRESSURE: 116 MMHG | DIASTOLIC BLOOD PRESSURE: 48 MMHG

## 2023-09-06 VITALS — WEIGHT: 240.3 LBS | HEIGHT: 62.01 IN | BODY MASS INDEX: 43.66 KG/M2

## 2023-09-06 VITALS — DIASTOLIC BLOOD PRESSURE: 60 MMHG | SYSTOLIC BLOOD PRESSURE: 117 MMHG

## 2023-09-06 VITALS — DIASTOLIC BLOOD PRESSURE: 74 MMHG | SYSTOLIC BLOOD PRESSURE: 110 MMHG

## 2023-09-06 VITALS — DIASTOLIC BLOOD PRESSURE: 71 MMHG | SYSTOLIC BLOOD PRESSURE: 121 MMHG

## 2023-09-06 VITALS — DIASTOLIC BLOOD PRESSURE: 69 MMHG | SYSTOLIC BLOOD PRESSURE: 120 MMHG

## 2023-09-06 VITALS — SYSTOLIC BLOOD PRESSURE: 119 MMHG | DIASTOLIC BLOOD PRESSURE: 69 MMHG

## 2023-09-06 VITALS — SYSTOLIC BLOOD PRESSURE: 124 MMHG | DIASTOLIC BLOOD PRESSURE: 75 MMHG

## 2023-09-06 VITALS — SYSTOLIC BLOOD PRESSURE: 121 MMHG | DIASTOLIC BLOOD PRESSURE: 81 MMHG

## 2023-09-06 DIAGNOSIS — E66.01: ICD-10-CM

## 2023-09-06 DIAGNOSIS — K80.10: Primary | ICD-10-CM

## 2023-09-06 PROCEDURE — 84703 CHORIONIC GONADOTROPIN ASSAY: CPT

## 2023-09-06 PROCEDURE — 87081 CULTURE SCREEN ONLY: CPT

## 2023-09-06 PROCEDURE — 76000 FLUOROSCOPY <1 HR PHYS/QHP: CPT

## 2023-09-06 NOTE — ANESTHESIA-GENERAL POST-OP
General


Patient Condition


Mental Status/LOC:  Same as Preop


Cardiovascular:  Satisfactory


Nausea/Vomiting:  Absent


Respiratory:  Satisfactory


Pain:  Controlled


Complications:  Absent





Post Op Complications


Complications


None





Follow Up Care/Instructions


Patient Instructions


None needed.





Anesthesia/Patient Condition


Patient Condition


Patient is doing well, no complaints, stable vital signs, no apparent adverse 

anesthesia problems.   


No complications reported per nursing.


D/C home per [facility] Criteria:  Yes











WILLIAM DAVIDSON CRNA            Sep 6, 2023 14:07

## 2023-09-06 NOTE — DIAGNOSTIC IMAGING REPORT
INDICATION: Intraoperative cholangiogram during cholecystectomy.



COMPARISON: None available. 



IMPRESSION:

Contrast fills the common bile duct, intrahepatic ducts and

spills into the duodenum. No filling defects indicate

choledocholithiasis. Air Kerma is 4.59 mGy. Please see procedure

report for more details. 



Dictated by: 



  Dictated on workstation # [id number]

## 2023-09-06 NOTE — PROGRESS NOTE-POST OPERATIVE
Post-Operative Progess Note


Surgeon (s)/Assistant (s)


Surgeon


NORBERT TAPIA DO


Assistant:  Miles





Pre-Operative Diagnosis


CHOLELITHIASIS, CHOLECYSTITIS





Post-Operative Diagnosis





same





Procedure & Operative Findings


Date of Procedure


9/6/23


Procedure Performed/Findings


PROCEDURE: Laparoscopic cholecystectomy with intraoperative cholangiogram. 


 


COMPLICATIONS: None. 


 


PROCEDURE:


The patient was taken to the operating suite and was prepped and draped in 


sterile fashion. A surgical pause was performed. Just superior to the umbilicus,




a 12 mm incision was made. Dissection was taken down to the fascia, which was 


then scored and grasped with a Kocher and the abdomen was then entered.  An 0 


Vicryl suture was placed in a figure-of-eight fashion and a Ash trocar was 


placed and secured. Pneumoperitoneum was achieved. A 5mm trochar place in the 


subxyphoid and 2 in the right upper quadrant. The gallbladder was then grasped 


and taken in the superior direction.  A second instrument was used to grasp down


at Rowan's pouch and pull in the infero-lateral direction. The cystic duct, 


and cystic artery were then dissected out. Clip was placed on the distal portion




of the cystic duct which was then partially transected. An arrow catheter was 


inserted into the duct. The cholangiogram was then performed. No filling defects




and contrast made its way into the duodenum.  Catheter removed. Clips were 

placed


on proximal portion of the cystic duct and then the duct was then transected. 


Clips were placed along the proximal and distal portion of the cystic artery 


which was then transected. Hook cautery was used to dissect the gallbladder from




the gallbladder fossa achieving hemostasis. The gallbladder was placed in an


Endobag and removed through the 12 mm trocar site. The abdomen was then 

reinspected.  


Copious amounts of irrigation were used to irrigate the abdomen and there were 

no 


signs of active bleeding. Hemostasis had been achieved. The 12 mm fascial defect




was then closed with 0 Vicryl suture that had been placed in a figure-of-eight 


fashion. The abdomen was then desufflated, the trocars were removed. The abdomen




was then washed and dried. The skin was then closed using 4-0 Monocryl in a 


subcuticular fashion. The abdomen was washed and dried and Skin Affix was placed




over incisions. Patient tolerated the procedure well without any complications 


and was taken to the recovery room in stable condition.








Dr. Aquino assisted on this case helping to make incisions, close incisions, 


identify anatomy and hold anatomy out of the way.


Anesthesia Type


GET





Estimated Blood Loss


Estimated blood loss (mL):  scant





Specimens/Packing


Specimens Removed


GB and contents











NORBERT TAPIA DO                Sep 6, 2023 12:01

## 2023-09-06 NOTE — DISCHARGE INST-SURGICAL
Discharge Inst-Surgical


Depart Medication/Instructions


New, Converted or Re-Newed RX:  Transmitted to Pharmacy


Patient Instructions


Follow up Appt:


Make appointment for 1 week. [phone]





Instructions:


No lifting greater than 20 pounds.


No strenuous activity. 


May shower in 24 hours, no tub bath or soaking.


Use incentive spirometer at home as directed.


No Smoking





Skin/Wound Care:


May remove bandages in am.  You need to leave the Dermabond on incision it will 

fall off on it's own. 





Symptoms to Report:


Appetite Changes, Extremity Discoloration, Numbness/Tingling, Swelling 

Increased, Bleeding Excessive, Eyesight Changes, Pain Increased, Urine Color 

Change, Constipation(Persistent), Fever over 101 degree F, Pain/Pressure in 

chest, Urinating Difficulty, Cough Up/Vomit Blood, Heart Beat Irreg/Pounding, 

Pain/Pressure in jaw, Cramps in feet or legs, Lightheadedness, Pain/Pressure in 

shoulder, Diarrhea(Persistent), Memory Changes Suddenly, Questions/Concerns, 

Weight gain consecutive days, Dizziness/Fainting, Nausea/Vomiting, Shortness of 

Breath, Weight gain over 2 pounds








If questions or concerns contact your physician 


Or seek help at emergency department.





Activity


Activity as Tolerated:  Yes


Activity Instructions:  Avoid Stress to Incision


Driving Instructions:  No Driving/Refer to Dr. Olson


Discharge Diet:  Avoid Fatty Foods, Low Fat/Low Cholesterol


Diet for 24 Hours:  No [place] Foods


Diet After 24 Hours:  Clear Liquid if Nauseous


If Any Problems/Questions/Issu:  Contact Your Physician, Go to Emergency Room





Skin/Wound Care


Infection Signs and Symptoms:  Increased Redness, Foul Odor of Wound, Increased 

Drainage, Skin Itchy or Has a Rash, Increased Swelling, Temperature Above 101  F


Wound Care Comment:  


heating pad to shoulder or neck tonight for pain


Bathing Instructions:  Shower


Stitches/Staples/Dermabond Dis:  NORBERT Crain DO                Sep 6, 2023 12:03